# Patient Record
Sex: FEMALE | Race: WHITE | HISPANIC OR LATINO
[De-identification: names, ages, dates, MRNs, and addresses within clinical notes are randomized per-mention and may not be internally consistent; named-entity substitution may affect disease eponyms.]

---

## 2018-06-02 ENCOUNTER — TRANSCRIPTION ENCOUNTER (OUTPATIENT)
Age: 23
End: 2018-06-02

## 2018-11-22 ENCOUNTER — EMERGENCY (EMERGENCY)
Facility: HOSPITAL | Age: 23
LOS: 0 days | Discharge: HOME | End: 2018-11-22
Attending: EMERGENCY MEDICINE | Admitting: EMERGENCY MEDICINE

## 2018-11-22 VITALS
TEMPERATURE: 97 F | SYSTOLIC BLOOD PRESSURE: 172 MMHG | WEIGHT: 289.91 LBS | DIASTOLIC BLOOD PRESSURE: 92 MMHG | HEART RATE: 97 BPM | OXYGEN SATURATION: 97 % | HEIGHT: 63 IN

## 2018-11-22 VITALS
OXYGEN SATURATION: 99 % | DIASTOLIC BLOOD PRESSURE: 71 MMHG | SYSTOLIC BLOOD PRESSURE: 136 MMHG | HEART RATE: 85 BPM | RESPIRATION RATE: 18 BRPM | TEMPERATURE: 97 F

## 2018-11-22 DIAGNOSIS — E86.0 DEHYDRATION: ICD-10-CM

## 2018-11-22 DIAGNOSIS — R11.0 NAUSEA: ICD-10-CM

## 2018-11-22 DIAGNOSIS — R11.2 NAUSEA WITH VOMITING, UNSPECIFIED: ICD-10-CM

## 2018-11-22 LAB
ALBUMIN SERPL ELPH-MCNC: 4.5 G/DL — SIGNIFICANT CHANGE UP (ref 3.5–5.2)
ALP SERPL-CCNC: 68 U/L — SIGNIFICANT CHANGE UP (ref 30–115)
ALT FLD-CCNC: 14 U/L — SIGNIFICANT CHANGE UP (ref 0–41)
ANION GAP SERPL CALC-SCNC: 16 MMOL/L — HIGH (ref 7–14)
APPEARANCE UR: CLEAR — SIGNIFICANT CHANGE UP
AST SERPL-CCNC: 20 U/L — SIGNIFICANT CHANGE UP (ref 0–41)
BACTERIA # UR AUTO: ABNORMAL
BASOPHILS # BLD AUTO: 0.04 K/UL — SIGNIFICANT CHANGE UP (ref 0–0.2)
BASOPHILS NFR BLD AUTO: 0.3 % — SIGNIFICANT CHANGE UP (ref 0–1)
BILIRUB DIRECT SERPL-MCNC: <0.2 MG/DL — SIGNIFICANT CHANGE UP (ref 0–0.2)
BILIRUB INDIRECT FLD-MCNC: 0 MG/DL — SIGNIFICANT CHANGE UP (ref 0.2–1.2)
BILIRUB SERPL-MCNC: <0.2 MG/DL — SIGNIFICANT CHANGE UP (ref 0.2–1.2)
BILIRUB UR-MCNC: NEGATIVE — SIGNIFICANT CHANGE UP
BUN SERPL-MCNC: 12 MG/DL — SIGNIFICANT CHANGE UP (ref 10–20)
CALCIUM SERPL-MCNC: 9.5 MG/DL — SIGNIFICANT CHANGE UP (ref 8.5–10.1)
CHLORIDE SERPL-SCNC: 101 MMOL/L — SIGNIFICANT CHANGE UP (ref 98–110)
CO2 SERPL-SCNC: 23 MMOL/L — SIGNIFICANT CHANGE UP (ref 17–32)
COD CRY URNS QL: NEGATIVE — SIGNIFICANT CHANGE UP
COLOR SPEC: YELLOW — SIGNIFICANT CHANGE UP
CREAT SERPL-MCNC: 0.6 MG/DL — LOW (ref 0.7–1.5)
DIFF PNL FLD: ABNORMAL
EOSINOPHIL # BLD AUTO: 0.06 K/UL — SIGNIFICANT CHANGE UP (ref 0–0.7)
EOSINOPHIL NFR BLD AUTO: 0.5 % — SIGNIFICANT CHANGE UP (ref 0–8)
EPI CELLS # UR: ABNORMAL /HPF
GLUCOSE SERPL-MCNC: 97 MG/DL — SIGNIFICANT CHANGE UP (ref 70–99)
GLUCOSE UR QL: NEGATIVE MG/DL — SIGNIFICANT CHANGE UP
GRAN CASTS # UR COMP ASSIST: NEGATIVE — SIGNIFICANT CHANGE UP
HCG SERPL QL: NEGATIVE — SIGNIFICANT CHANGE UP
HCT VFR BLD CALC: 42.1 % — SIGNIFICANT CHANGE UP (ref 37–47)
HGB BLD-MCNC: 13.5 G/DL — SIGNIFICANT CHANGE UP (ref 12–16)
HYALINE CASTS # UR AUTO: NEGATIVE — SIGNIFICANT CHANGE UP
IMM GRANULOCYTES NFR BLD AUTO: 0.2 % — SIGNIFICANT CHANGE UP (ref 0.1–0.3)
KETONES UR-MCNC: NEGATIVE — SIGNIFICANT CHANGE UP
LEUKOCYTE ESTERASE UR-ACNC: NEGATIVE — SIGNIFICANT CHANGE UP
LYMPHOCYTES # BLD AUTO: 1.74 K/UL — SIGNIFICANT CHANGE UP (ref 1.2–3.4)
LYMPHOCYTES # BLD AUTO: 14.2 % — LOW (ref 20.5–51.1)
MAGNESIUM SERPL-MCNC: 2 MG/DL — SIGNIFICANT CHANGE UP (ref 1.8–2.4)
MCHC RBC-ENTMCNC: 27.3 PG — SIGNIFICANT CHANGE UP (ref 27–31)
MCHC RBC-ENTMCNC: 32.1 G/DL — SIGNIFICANT CHANGE UP (ref 32–37)
MCV RBC AUTO: 85.2 FL — SIGNIFICANT CHANGE UP (ref 81–99)
MONOCYTES # BLD AUTO: 0.44 K/UL — SIGNIFICANT CHANGE UP (ref 0.1–0.6)
MONOCYTES NFR BLD AUTO: 3.6 % — SIGNIFICANT CHANGE UP (ref 1.7–9.3)
NEUTROPHILS # BLD AUTO: 9.94 K/UL — HIGH (ref 1.4–6.5)
NEUTROPHILS NFR BLD AUTO: 81.2 % — HIGH (ref 42.2–75.2)
NITRITE UR-MCNC: NEGATIVE — SIGNIFICANT CHANGE UP
NRBC # BLD: 0 /100 WBCS — SIGNIFICANT CHANGE UP (ref 0–0)
PH UR: 7 — SIGNIFICANT CHANGE UP (ref 5–8)
PLATELET # BLD AUTO: 348 K/UL — SIGNIFICANT CHANGE UP (ref 130–400)
POTASSIUM SERPL-MCNC: 4.6 MMOL/L — SIGNIFICANT CHANGE UP (ref 3.5–5)
POTASSIUM SERPL-SCNC: 4.6 MMOL/L — SIGNIFICANT CHANGE UP (ref 3.5–5)
PROT SERPL-MCNC: 7.7 G/DL — SIGNIFICANT CHANGE UP (ref 6–8)
PROT UR-MCNC: 30 MG/DL
RBC # BLD: 4.94 M/UL — SIGNIFICANT CHANGE UP (ref 4.2–5.4)
RBC # FLD: 12.6 % — SIGNIFICANT CHANGE UP (ref 11.5–14.5)
RBC CASTS # UR COMP ASSIST: ABNORMAL /HPF
SODIUM SERPL-SCNC: 140 MMOL/L — SIGNIFICANT CHANGE UP (ref 135–146)
SP GR SPEC: 1.02 — SIGNIFICANT CHANGE UP (ref 1.01–1.03)
TRI-PHOS CRY UR QL COMP ASSIST: NEGATIVE — SIGNIFICANT CHANGE UP
URATE CRY FLD QL MICRO: NEGATIVE — SIGNIFICANT CHANGE UP
UROBILINOGEN FLD QL: 0.2 MG/DL — SIGNIFICANT CHANGE UP (ref 0.2–0.2)
WBC # BLD: 12.24 K/UL — HIGH (ref 4.8–10.8)
WBC # FLD AUTO: 12.24 K/UL — HIGH (ref 4.8–10.8)
WBC UR QL: SIGNIFICANT CHANGE UP /HPF

## 2018-11-22 RX ORDER — FAMOTIDINE 10 MG/ML
20 INJECTION INTRAVENOUS ONCE
Qty: 0 | Refills: 0 | Status: COMPLETED | OUTPATIENT
Start: 2018-11-22 | End: 2018-11-22

## 2018-11-22 RX ORDER — SODIUM CHLORIDE 9 MG/ML
3 INJECTION INTRAMUSCULAR; INTRAVENOUS; SUBCUTANEOUS ONCE
Qty: 0 | Refills: 0 | Status: COMPLETED | OUTPATIENT
Start: 2018-11-22 | End: 2018-11-22

## 2018-11-22 RX ORDER — ONDANSETRON 8 MG/1
1 TABLET, FILM COATED ORAL
Qty: 12 | Refills: 0 | OUTPATIENT
Start: 2018-11-22 | End: 2018-11-25

## 2018-11-22 RX ORDER — ONDANSETRON 8 MG/1
4 TABLET, FILM COATED ORAL ONCE
Qty: 0 | Refills: 0 | Status: COMPLETED | OUTPATIENT
Start: 2018-11-22 | End: 2018-11-22

## 2018-11-22 RX ORDER — SODIUM CHLORIDE 9 MG/ML
1000 INJECTION INTRAMUSCULAR; INTRAVENOUS; SUBCUTANEOUS ONCE
Qty: 0 | Refills: 0 | Status: COMPLETED | OUTPATIENT
Start: 2018-11-22 | End: 2018-11-22

## 2018-11-22 RX ORDER — SODIUM CHLORIDE 9 MG/ML
2000 INJECTION INTRAMUSCULAR; INTRAVENOUS; SUBCUTANEOUS ONCE
Qty: 0 | Refills: 0 | Status: COMPLETED | OUTPATIENT
Start: 2018-11-22 | End: 2018-11-22

## 2018-11-22 RX ADMIN — SODIUM CHLORIDE 1000 MILLILITER(S): 9 INJECTION INTRAMUSCULAR; INTRAVENOUS; SUBCUTANEOUS at 15:30

## 2018-11-22 RX ADMIN — FAMOTIDINE 20 MILLIGRAM(S): 10 INJECTION INTRAVENOUS at 13:34

## 2018-11-22 RX ADMIN — ONDANSETRON 4 MILLIGRAM(S): 8 TABLET, FILM COATED ORAL at 13:33

## 2018-11-22 RX ADMIN — SODIUM CHLORIDE 3 MILLILITER(S): 9 INJECTION INTRAMUSCULAR; INTRAVENOUS; SUBCUTANEOUS at 13:34

## 2018-11-22 RX ADMIN — SODIUM CHLORIDE 2000 MILLILITER(S): 9 INJECTION INTRAMUSCULAR; INTRAVENOUS; SUBCUTANEOUS at 13:34

## 2018-11-22 NOTE — ED PROVIDER NOTE - MEDICAL DECISION MAKING DETAILS
23f w vomiting after eating dinner w friends and drinking EtOH. Others w similar symptoms. Mild dehydration. No abd tender. Labs reviewed. Pt feeling better after antiemetics & IV fluids. Tolerating PO intake in ED. Pt advised regarding symptomatic/supportive care, importance of PMD f/u, and symptoms to prompt ED return.

## 2018-11-22 NOTE — ED PROVIDER NOTE - CARE PLAN
Principal Discharge DX:	Nausea and vomiting, intractability of vomiting not specified, unspecified vomiting type Principal Discharge DX:	Nausea and vomiting  Secondary Diagnosis:	Dehydration

## 2018-11-22 NOTE — ED PROVIDER NOTE - PHYSICAL EXAMINATION
GENERAL: Well-developed, overweight female. Intermittently dry heaving into basin at bedside. No emesis noted. Otherwise sitting quietly, answering questions appropriately.  HEENT: Sclera anicteric. Mucous membranes dry.  NECK: supple, no cervical adenopathy.  CARDIOVASCULAR: Regular rate and rhythm, S1 and S2 present. No murmurs, rubs, or gallops. 2+ radial pulses bilaterally.  RESPIRATORY: Normal effort. Clear to auscultation bilaterally without wheezes, rales, or rhonchi.  GI: Normal bowel sounds. Abdomen obese but soft and non-distended. Nontender in all four quadrants. No rebound or guarding. No rigidity.  : No CVA tenderness bilaterally.  INTEGUMENTARY: Pink, warm, dry. Capillary refill <2 seconds.  NEURO: A&Ox3. Moving all four extremities equally. Gait normal.

## 2018-11-22 NOTE — ED PROVIDER NOTE - PROGRESS NOTE DETAILS
Pt seen and evaluated by me. labs, fluids, zofran, famotidine ordered. Recheck - patient states she is feeling a lot better, no more episodes of vomiting since receiving Zofran and Famotidine. Fluids hanging. Labs and UA pending. Recheck - patient states she continues to feel symptom free. Will try to give urine sample. Patient able to give urine sample, reports that she is still asymptomatic. Will po challenge while awaiting UA results. Patient tolerating po challenge with no further nausea or vomiting. States that she felt dizzy after getting up to give urine sample. Additional liter of NS ordered. Patient able to give urine sample, reports that she is still asymptomatic. Abdomen is soft, nontender, no rebound or guarding. Will po challenge while awaiting UA results. Patient is feeling complete resolution of her symptoms including n/v, dizziness, abd pain. Abdomen is soft, nontender in all four quadrants. Patient is ready for discharge home. Return precautions discussed. Zofran sent to pharmacy.

## 2018-11-22 NOTE — ED PROVIDER NOTE - PRINCIPAL DIAGNOSIS
Nausea and vomiting, intractability of vomiting not specified, unspecified vomiting type Nausea and vomiting

## 2018-11-22 NOTE — ED ADULT NURSE NOTE - NSIMPLEMENTINTERV_GEN_ALL_ED
Implemented All Universal Safety Interventions:  [place] to call system. Call bell, personal items and telephone within reach. Instruct patient to call for assistance. Room bathroom lighting operational. Non-slip footwear when patient is off stretcher. Physically safe environment: no spills, clutter or unnecessary equipment. Stretcher in lowest position, wheels locked, appropriate side rails in place.

## 2018-11-22 NOTE — ED PROVIDER NOTE - NSFOLLOWUPINSTRUCTIONS_ED_ALL_ED_FT
Nausea / Vomiting    Nausea is the feeling that you have to vomit. As nausea gets worse, it can lead to vomiting. Vomiting puts you at an increased risk for dehydration. Older adults and people with other diseases or a weak immune system are at higher risk for dehydration. Drink clear fluids in small but frequent amounts as tolerated. Eat bland, easy-to-digest foods in small amounts as tolerated. Please follow up with your primary care provider in 1-2 days.    SEEK IMMEDIATE MEDICAL CARE IF YOU HAVE ANY OF THE FOLLOWING SYMPTOMS: fever, inability to keep sufficient fluids down, black or bloody vomitus, black or bloody stools, lightheadedness/dizziness, chest pain, severe headache, rash, shortness of breath, cold or clammy skin, confusion, pain with urination, or any signs of dehydration.

## 2018-11-22 NOTE — ED PROVIDER NOTE - ATTENDING CONTRIBUTION TO CARE
23f w hx of PCOS presents w nonbloody, nonbilious vomiting overnight after drinking EtOH and eating dinner w friends. Other people also w vomiting. No diarrhea, no abdominal pain. No black/bloody stools. No recent travel/hosp/abx. Symptoms are constant, moderate, no exacerbating/alleviating.    Review of Systems  Constitutional:  No fever or chills.   Eyes:  Negative.   ENMT:  No nasal congestion, discharge, or throat pain.   Cardiac:  No chest pain, syncope, or edema.  Respiratory:  No dyspnea, wheezing, or cough. No hemoptysis.  GI:  See HPI.  :  No dysuria or hematuria.  Musculoskeletal:  No gait abnormality, joint swelling, or joint daryn.  Skin:  No skin rash, jaundice, or lesions.  Neuro:  No headache, loss of sensation, or focal weakness.  No change in mental status.     Physical Exam  General: Awake, alert, NAD, WDWN, non-toxic appearing, NCAT  Eyes: PERRL, EOMI, no icterus, lids and conjunctivae are normal  ENT: External inspection normal, pink/dry membranes, pharynx normal  CV: S1S2, regular rate and rhythm, no murmur/gallops/rubs, no JVD, 2+ pulses b/l, no edema/cords/homans, warm/well-perfused  Respiratory: Normal respiratory rate/effort, no respiratory distress, normal voice, speaking full sentences, lungs clear to auscultation b/l, no wheezing/rales/rhonchi, no retractions, no stridor  Abdomen: Soft abdomen, no tender/distended/guarding/rebound, no CVA tender  Musculoskeletal: FROM all 4 extremities, N/V intact  Neck: FROM neck, supple, no meningismus, trachea midline, no JVD  Integumentary: Color normal for race, warm and dry, no rash  Neuro: Oriented x3, CN 2-12 grossly intact, normal motor, normal sensory  Psych: Oriented x3, mood normal, affect normal     23f w vomiting after eating dinner w friends and drinking EtOH. Others w similar symptoms. Mild dehydration. No abd tender. --CBC, CMP, lipase, lactate, HCG. --IV fluids, analgesia/antiemetics as needed, observe/re-assess, likely dc w symptomatic and supportive care, f/u PMD. 23f w hx of PCOS presents w nonbloody, nonbilious vomiting overnight after drinking EtOH and eating dinner w friends. Other people also w vomiting. No diarrhea, no abdominal pain. No black/bloody stools. No recent travel/hosp/abx. Symptoms are constant, moderate, no exacerbating/alleviating.    Review of Systems  Constitutional:  No fever or chills.   Eyes:  Negative.   ENMT:  No nasal congestion, discharge, or throat pain.   Cardiac:  No chest pain, syncope, or edema.  Respiratory:  No dyspnea, wheezing, or cough. No hemoptysis.  GI:  See HPI.  :  No dysuria or hematuria.  Musculoskeletal:  No gait abnormality, joint swelling, or joint pain.  Skin:  No skin rash, jaundice, or lesions.  Neuro:  No headache, loss of sensation, or focal weakness.  No change in mental status.     Physical Exam  General: Awake, alert, NAD, WDWN, non-toxic appearing, NCAT  Eyes: PERRL, EOMI, no icterus, lids and conjunctivae are normal  ENT: External inspection normal, pink/dry membranes, pharynx normal  CV: S1S2, regular rate and rhythm, no murmur/gallops/rubs, no JVD, 2+ pulses b/l, no edema/cords/homans, warm/well-perfused  Respiratory: Normal respiratory rate/effort, no respiratory distress, normal voice, speaking full sentences, lungs clear to auscultation b/l, no wheezing/rales/rhonchi, no retractions, no stridor  Abdomen: Soft abdomen, obese, no tender/distended/guarding/rebound, no CVA tender  Musculoskeletal: FROM all 4 extremities, N/V intact  Neck: FROM neck, supple, no meningismus, trachea midline, no JVD  Integumentary: Color normal for race, warm and dry, no rash  Neuro: Oriented x3, CN 2-12 grossly intact, normal motor, normal sensory  Psych: Oriented x3, mood normal, affect normal     23f w vomiting after eating dinner w friends and drinking EtOH. Others w similar symptoms. Mild dehydration. No abd tender. --CBC, CMP, lipase, lactate, HCG. --IV fluids, analgesia/antiemetics as needed, observe/re-assess, likely dc w symptomatic and supportive care, f/u PMD.

## 2018-11-22 NOTE — ED PROVIDER NOTE - NS ED ROS FT
CONSTITUTIONAL: (-) fevers, (-) chills, (-) fatigue  EYES: (-) eye redness, (-) eye discharge  ENT: (-) congestion/rhinorrhea, (-) sinus pain, (-) sore throat  NECK: (-) neck pain, (-) neck stiffness, (-) lymphadenopthy  CARDIOVASCULAR: (-) chest pain, (-) palpitations  RESPIRATORY: (-) cough, (-) shortness of breath  GI: see HPI.  : (-) dysuria, (-) hematuria, (-) frequency, (-) urgency, (-) flank pain  MSK: (-) back pain, (-) joint pain  INTEGUMENTARY: (-) rashes, (-) wounds  NEURO: (-) headache, (-) syncope, (-) LOC, (-) head injury

## 2018-11-22 NOTE — ED PROVIDER NOTE - OBJECTIVE STATEMENT
23 year old female with a history of PCOS presents to the ED with nausea and vomiting. Pt was at a friends house for thanksgiving yesterday where she ate pizza and drank (endorses 5 liquor shots and 4 margaritas). She remembers the entire night and going home around 2:00 AM today when she began to feel nauseated. She has thrown up more than 10 times. Denies any hematemesis, fevers, chills, abdominal pain, diarrhea, melena, hematochezia, back pain, urinary symptoms, vaginal discharge. 2 other people drinking at dinner last night have similar symptoms.   She is currently menstruating, which began today - normal per patient. She is sexually active. She normally only drinks 1 drink per month. Denies cigarette use or other drug use. No abdominal surgical history. No recent travel or antibiotics. 23 year old female with a history of PCOS presents to the ED with nausea and vomiting. Pt was at a friends house for thanksgiving yesterday where she ate pizza and drank (endorses 5 liquor shots and 4 margaritas). She remembers the entire night and going home around 2:00 AM today when she began to feel nauseated. She has thrown up more than 10 times. Denies any hematemesis, fevers, chills, abdominal pain, diarrhea, melena, hematochezia, back pain, urinary symptoms, vaginal discharge. 2 other people drinking at dinner last night have similar symptoms. She is currently menstruating, which began today - normal per patient. She is sexually active. She normally only drinks once per month. Denies cigarette use or other drug use. No abdominal surgical history. No recent travel or antibiotics.

## 2019-03-05 PROBLEM — E28.2 POLYCYSTIC OVARIAN SYNDROME: Chronic | Status: ACTIVE | Noted: 2018-11-22

## 2019-03-11 PROBLEM — Z00.00 ENCOUNTER FOR PREVENTIVE HEALTH EXAMINATION: Status: ACTIVE | Noted: 2019-03-11

## 2019-03-25 ENCOUNTER — APPOINTMENT (OUTPATIENT)
Dept: BREAST CENTER | Facility: CLINIC | Age: 24
End: 2019-03-25

## 2019-05-11 ENCOUNTER — TRANSCRIPTION ENCOUNTER (OUTPATIENT)
Age: 24
End: 2019-05-11

## 2019-07-13 ENCOUNTER — TRANSCRIPTION ENCOUNTER (OUTPATIENT)
Age: 24
End: 2019-07-13

## 2019-07-24 ENCOUNTER — TRANSCRIPTION ENCOUNTER (OUTPATIENT)
Age: 24
End: 2019-07-24

## 2019-08-08 ENCOUNTER — TRANSCRIPTION ENCOUNTER (OUTPATIENT)
Age: 24
End: 2019-08-08

## 2019-12-03 ENCOUNTER — TRANSCRIPTION ENCOUNTER (OUTPATIENT)
Age: 24
End: 2019-12-03